# Patient Record
Sex: FEMALE | Race: BLACK OR AFRICAN AMERICAN | NOT HISPANIC OR LATINO | Employment: FULL TIME | URBAN - METROPOLITAN AREA
[De-identification: names, ages, dates, MRNs, and addresses within clinical notes are randomized per-mention and may not be internally consistent; named-entity substitution may affect disease eponyms.]

---

## 2021-12-26 ENCOUNTER — HOSPITAL ENCOUNTER (EMERGENCY)
Facility: HOSPITAL | Age: 26
Discharge: HOME | End: 2021-12-26
Attending: EMERGENCY MEDICINE
Payer: COMMERCIAL

## 2021-12-26 VITALS
DIASTOLIC BLOOD PRESSURE: 88 MMHG | OXYGEN SATURATION: 100 % | TEMPERATURE: 98 F | RESPIRATION RATE: 17 BRPM | SYSTOLIC BLOOD PRESSURE: 145 MMHG | HEART RATE: 74 BPM

## 2021-12-26 DIAGNOSIS — R07.89 CHEST WALL PAIN: Primary | ICD-10-CM

## 2021-12-26 LAB
B-HCG UR QL: NEGATIVE
CTP QC/QA: YES

## 2021-12-26 PROCEDURE — 81025 URINE PREGNANCY TEST: CPT | Mod: ER | Performed by: EMERGENCY MEDICINE

## 2021-12-26 PROCEDURE — 96372 THER/PROPH/DIAG INJ SC/IM: CPT | Mod: ER

## 2021-12-26 PROCEDURE — 99284 EMERGENCY DEPT VISIT MOD MDM: CPT | Mod: 25,ER

## 2021-12-26 PROCEDURE — 63600175 PHARM REV CODE 636 W HCPCS: Mod: ER | Performed by: EMERGENCY MEDICINE

## 2021-12-26 RX ORDER — CYCLOBENZAPRINE HCL 10 MG
10 TABLET ORAL 3 TIMES DAILY PRN
Qty: 20 TABLET | Refills: 0 | Status: SHIPPED | OUTPATIENT
Start: 2021-12-26 | End: 2022-01-05

## 2021-12-26 RX ORDER — NAPROXEN 500 MG/1
500 TABLET ORAL 2 TIMES DAILY WITH MEALS
Qty: 20 TABLET | Refills: 0 | Status: SHIPPED | OUTPATIENT
Start: 2021-12-26 | End: 2021-12-31

## 2021-12-26 RX ORDER — KETOROLAC TROMETHAMINE 30 MG/ML
30 INJECTION, SOLUTION INTRAMUSCULAR; INTRAVENOUS
Status: COMPLETED | OUTPATIENT
Start: 2021-12-26 | End: 2021-12-26

## 2021-12-26 RX ADMIN — KETOROLAC TROMETHAMINE 30 MG: 30 INJECTION, SOLUTION INTRAMUSCULAR at 08:12

## 2021-12-27 NOTE — ED PROVIDER NOTES
"Encounter Date: 12/26/2021    SCRIBE #1 NOTE: I, Tonny Jacobsen, am scribing for, and in the presence of,  Freddy Powers MD. I have scribed the following portions of the note - Other sections scribed: HPI, ROS, PE.       History     Chief Complaint   Patient presents with    Chest Pain     Pt reports mid sternal CP onset this AM, describes as "pulling" pain      Tracy Wilks is a 26 y.o. female who presents to the ED for evaluation of constant upper right sided chest pain, onset 2 AM this morning.  Patient describes her pain as a "Charley Horse" or muscle spasm that is a 7.5/10 on the pain scale.  Patient states that the pain does not radiate to her neck or any other region.  Patient notes that her pain is worse with deep breath and movement such as walking, bending over, or lifting her arm.  Patient notes that the pain is not worse with exertion.  Patient states that the pain woke her up.  Patient notes use of cough and chest congestion medicine for her symptoms.  Patient also notes shortness of breath and a cough.  Patient denies any allergies.  Patient denies smoking.  Patient denies using birth control.  Patient denies any recent long car trips, long plane trips, surgeries, or anything else that would increase her risk for pulmonary embolisms.  Patient denies any history of COVID-19 infection.  Patient denies any recent sick exposure.  Patient denies any possibility of pregnancy.  Patient denies fever, rhinorrhea, sore throat, head ache, swelling or pain to her calves, swelling or bruising to her chest, or any other associated symptoms.    The history is provided by the patient. No  was used.     Review of patient's allergies indicates:  No Known Allergies  History reviewed. No pertinent past medical history.  History reviewed. No pertinent surgical history.  No family history on file.  Social History     Tobacco Use    Smoking status: Never Smoker    Smokeless tobacco: Never Used "   Substance Use Topics    Alcohol use: Yes     Review of Systems   Constitutional: Negative for fever.   HENT: Negative for rhinorrhea and sore throat.    Eyes: Negative for visual disturbance.   Respiratory: Positive for cough and shortness of breath.    Cardiovascular: Positive for chest pain.   Gastrointestinal: Negative for abdominal pain.   Genitourinary: Negative for dysuria.   Musculoskeletal: Negative for back pain.   Skin: Negative for rash.   Neurological: Negative for headaches.       Physical Exam     Initial Vitals [12/26/21 1818]   BP Pulse Resp Temp SpO2   (!) 151/102 75 17 98.1 °F (36.7 °C) 100 %      MAP       --         Physical Exam    Nursing note and vitals reviewed.  Constitutional: She appears well-developed and well-nourished.   HENT:   Head: Normocephalic and atraumatic.   Eyes: EOM are normal. Pupils are equal, round, and reactive to light.   Neck: Neck supple. No thyromegaly present. No JVD present.   Normal range of motion.  Cardiovascular: Normal rate and regular rhythm. Exam reveals no gallop and no friction rub.    No murmur heard.  Pulmonary/Chest: Breath sounds normal. No respiratory distress.   Highly reproducible pain and tenderness to the right anterior chest wall.   Abdominal: Abdomen is soft. Bowel sounds are normal. There is no abdominal tenderness.   Musculoskeletal:         General: Normal range of motion.      Cervical back: Normal range of motion and neck supple.     Neurological: She is alert and oriented to person, place, and time. She has normal strength.   Skin: Skin is warm and dry. Capillary refill takes less than 2 seconds.         ED Course   Procedures  Labs Reviewed   POCT URINE PREGNANCY          Imaging Results    None          Medications   ketorolac injection 30 mg (30 mg Intramuscular Given 12/26/21 2010)     Medical Decision Making:   Clinical Tests:   Lab Tests: Ordered and Reviewed  The following lab test(s) were unremarkable: UPT          Scribe  Attestation:   Scribe #1: I performed the above scribed service and the documentation accurately describes the services I performed. I attest to the accuracy of the note.                 I, Freddy Walsh, personally performed the services described in this documentation. All medical record entries made by the scribe were at my direction and in my presence.  I have reviewed the chart and agree that the record reflects my personal performance and is accurate and complete      Clinical Impression:   Final diagnoses:  [R07.89] Chest wall pain (Primary)          ED Disposition Condition    Discharge Stable        ED Prescriptions     Medication Sig Dispense Start Date End Date Auth. Provider    naproxen (NAPROSYN) 500 MG tablet () Take 1 tablet (500 mg total) by mouth 2 (two) times daily with meals. for 5 days 20 tablet 2021 Freddy Walsh MD    cyclobenzaprine (FLEXERIL) 10 MG tablet () Take 1 tablet (10 mg total) by mouth 3 (three) times daily as needed for Muscle spasms. 20 tablet 2021 Freddy Walsh MD        Follow-up Information     Follow up With Specialties Details Why Contact [facility] ED Emergency Medicine  As needed, If symptoms worsen [address] [facility] [id number] [phone]    [facility]  Schedule an appointment as soon as possible for a visit  to establish primary care if you do not have one and to recheck your blood pressure that is elevated today. [address] [place] [address]  [phone]             Freddy Walsh MD  [phone]

## 2021-12-27 NOTE — ED NOTES
"Right side chest pain, states it feels like a "nate horse", tender to palpation and worse with right arm motions    "

## 2023-03-29 ENCOUNTER — PATIENT MESSAGE (OUTPATIENT)
Dept: OBSTETRICS AND GYNECOLOGY | Facility: CLINIC | Age: 28
End: 2023-03-29

## 2023-03-29 ENCOUNTER — OFFICE VISIT (OUTPATIENT)
Dept: OBSTETRICS AND GYNECOLOGY | Facility: CLINIC | Age: 28
End: 2023-03-29
Payer: COMMERCIAL

## 2023-03-29 VITALS — WEIGHT: 293 LBS | SYSTOLIC BLOOD PRESSURE: 140 MMHG | DIASTOLIC BLOOD PRESSURE: 80 MMHG

## 2023-03-29 DIAGNOSIS — Z11.3 SCREENING EXAMINATION FOR STD (SEXUALLY TRANSMITTED DISEASE): ICD-10-CM

## 2023-03-29 DIAGNOSIS — Z01.419 WELL WOMAN EXAM WITH ROUTINE GYNECOLOGICAL EXAM: Primary | ICD-10-CM

## 2023-03-29 DIAGNOSIS — N93.9 ABNORMAL UTERINE BLEEDING: ICD-10-CM

## 2023-03-29 PROCEDURE — 3077F PR MOST RECENT SYSTOLIC BLOOD PRESSURE >= 140 MM HG: ICD-10-PCS | Mod: CPTII,S$GLB,, | Performed by: OBSTETRICS & GYNECOLOGY

## 2023-03-29 PROCEDURE — 87591 N.GONORRHOEAE DNA AMP PROB: CPT | Performed by: OBSTETRICS & GYNECOLOGY

## 2023-03-29 PROCEDURE — 99385 PR PREVENTIVE VISIT,NEW,18-39: ICD-10-PCS | Mod: 1D,GY,S$GLB, | Performed by: OBSTETRICS & GYNECOLOGY

## 2023-03-29 PROCEDURE — 99999 PR PBB SHADOW E&M-EST. PATIENT-LVL III: CPT | Mod: PBBFAC,,, | Performed by: OBSTETRICS & GYNECOLOGY

## 2023-03-29 PROCEDURE — 3079F DIAST BP 80-89 MM HG: CPT | Mod: CPTII,S$GLB,, | Performed by: OBSTETRICS & GYNECOLOGY

## 2023-03-29 PROCEDURE — 1159F MED LIST DOCD IN RCRD: CPT | Mod: CPTII,S$GLB,, | Performed by: OBSTETRICS & GYNECOLOGY

## 2023-03-29 PROCEDURE — 99999 PR PBB SHADOW E&M-EST. PATIENT-LVL III: ICD-10-PCS | Mod: PBBFAC,,, | Performed by: OBSTETRICS & GYNECOLOGY

## 2023-03-29 PROCEDURE — 1160F RVW MEDS BY RX/DR IN RCRD: CPT | Mod: CPTII,S$GLB,, | Performed by: OBSTETRICS & GYNECOLOGY

## 2023-03-29 PROCEDURE — 1159F PR MEDICATION LIST DOCUMENTED IN MEDICAL RECORD: ICD-10-PCS | Mod: CPTII,S$GLB,, | Performed by: OBSTETRICS & GYNECOLOGY

## 2023-03-29 PROCEDURE — 3077F SYST BP >= 140 MM HG: CPT | Mod: CPTII,S$GLB,, | Performed by: OBSTETRICS & GYNECOLOGY

## 2023-03-29 PROCEDURE — 88175 CYTOPATH C/V AUTO FLUID REDO: CPT | Performed by: OBSTETRICS & GYNECOLOGY

## 2023-03-29 PROCEDURE — 1160F PR REVIEW ALL MEDS BY PRESCRIBER/CLIN PHARMACIST DOCUMENTED: ICD-10-PCS | Mod: CPTII,S$GLB,, | Performed by: OBSTETRICS & GYNECOLOGY

## 2023-03-29 PROCEDURE — 3079F PR MOST RECENT DIASTOLIC BLOOD PRESSURE 80-89 MM HG: ICD-10-PCS | Mod: CPTII,S$GLB,, | Performed by: OBSTETRICS & GYNECOLOGY

## 2023-03-29 PROCEDURE — 81514 NFCT DS BV&VAGINITIS DNA ALG: CPT | Performed by: OBSTETRICS & GYNECOLOGY

## 2023-03-29 PROCEDURE — 99385 PREV VISIT NEW AGE 18-39: CPT | Mod: 1D,GY,S$GLB, | Performed by: OBSTETRICS & GYNECOLOGY

## 2023-03-29 RX ORDER — TRANEXAMIC ACID 650 MG/1
1300 TABLET ORAL 3 TIMES DAILY
Qty: 30 TABLET | Refills: 0 | Status: SHIPPED | OUTPATIENT
Start: 2023-03-29 | End: 2023-04-03

## 2023-03-29 RX ORDER — TRANEXAMIC ACID 650 MG/1
1300 TABLET ORAL 3 TIMES DAILY
Qty: 30 TABLET | Refills: 0 | Status: SHIPPED | OUTPATIENT
Start: 2023-03-29 | End: 2023-03-29 | Stop reason: SDUPTHER

## 2023-03-29 NOTE — PROGRESS NOTES
History & Physical  Gynecology      SUBJECTIVE:     Chief Complaint: Annual Exam       History of Present Illness:  Annual Exam-Premenopausal  Ms. Wilks is a 26 y/o G0 who presents for annual exam. The patient reports that her periods are recently abnormal. She reports that her periods were normal and monthly until 08/2022. She had no period from August 2022 to December 2022. She reports that her periods were monthly Jan and Feb, but se started bleeding March 1 and has been bleeding ever since. She reports that she changes her pad 9x. The pads are not saturated but she does notice that the bleeding is heavier than her normal period. She denies new medications, weight changes, hair loss or facial hair growth.    The patient is sexually active. GYN screening history: last pap: was normal. The patient wears seatbelts: yes. The patient participates in regular exercise: no. Has the patient ever been transfused or tattooed?: not asked. The patient reports that there is not domestic violence in her life.      Review of patient's allergies indicates:  No Known Allergies    History reviewed. No pertinent past medical history.  History reviewed. No pertinent surgical history.  OB History    No obstetric history on file.       History reviewed. No pertinent family history.  Social History     Tobacco Use    Smoking status: Never    Smokeless tobacco: Never   Substance Use Topics    Alcohol use: Yes       Current Outpatient Medications   Medication Sig    tranexamic acid (LYSTEDA) 650 mg tablet Take 2 tablets (1,300 mg total) by mouth 3 (three) times daily. for 5 days     No current facility-administered medications for this visit.         Review of Systems:  Review of Systems   Constitutional:  Negative for chills and fever.   Eyes:  Negative for visual disturbance.   Respiratory:  Negative for cough and wheezing.    Cardiovascular:  Negative for chest pain and palpitations.   Gastrointestinal:  Negative for abdominal pain,  nausea and vomiting.   Genitourinary:  Positive for menorrhagia and menstrual problem. Negative for dysuria, frequency, hematuria, pelvic pain, vaginal bleeding, vaginal discharge and vaginal pain.   Neurological:  Negative for headaches.   Psychiatric/Behavioral:  Negative for depression.       OBJECTIVE:     Physical Exam:  Physical Exam  Vitals and nursing note reviewed. Exam conducted with a chaperone present.   Constitutional:       Appearance: She is well-developed.   Cardiovascular:      Rate and Rhythm: Normal rate.   Pulmonary:      Effort: Pulmonary effort is normal. No respiratory distress.   Chest:   Breasts:     Breasts are symmetrical.   Abdominal:      General: There is no distension.      Palpations: Abdomen is soft.      Tenderness: There is no abdominal tenderness.   Genitourinary:     Comments: Vaginal bleeding. Uterus not palpable  Skin:     General: Skin is warm and dry.   Neurological:      Mental Status: She is alert and oriented to person, place, and time.         ASSESSMENT:       ICD-10-CM ICD-9-CM    1. Well woman exam with routine gynecological exam  Z01.419 V72.31 Liquid-Based Pap Smear, Screening      2. Abnormal uterine bleeding  N93.9 626.9 TSH      Progesterone      LUTEINIZING HORMONE      Follicle Stimulating Hormone      17-HYDROXYPROGESTERONE      Testosterone, Free      US Pelvis Comp with Transvag NON-OB (xpd      tranexamic acid (LYSTEDA) 650 mg tablet      3. Screening examination for STD (sexually transmitted disease)  Z11.3 V74.5 RPR      HIV 1/2 Ag/Ab (4th Gen)      Hepatitis Panel, Acute      Vaginosis Screen by DNA Probe      C. trachomatis/N. gonorrhoeae by AMP DNA             Plan:      Tracy was seen today for annual exam.    Diagnoses and all orders for this visit:    Well woman exam with routine gynecological exam  -     Liquid-Based Pap Smear, Screening    Abnormal uterine bleeding  -     TSH; Future  -     Progesterone; Future  -     LUTEINIZING HORMONE; Future  -      Follicle Stimulating Hormone; Future  -     17-HYDROXYPROGESTERONE; Future  -     Testosterone, Free; Future  -     US Pelvis Comp with Transvag NON-OB (xpd; Future  -     tranexamic acid (LYSTEDA) 650 mg tablet; Take 2 tablets (1,300 mg total) by mouth 3 (three) times daily. for 5 days  - Discussed PCOS with patient using [place] Criteria. Patient with oligomenorrhea then heavy, extended period. Discussed lab tests ordered and TVUS to further explore the diagnosis.   -  Patient understands that OCPs would assist with hair growth and menstrual abnormalities. Discussed insulin resistance and cardiovascular disease that has been linked to PCOS. Discussed the most important step to take at this time is lifestyle modifications with exercise and diet modification.       Screening examination for STD (sexually transmitted disease)  -     RPR; Future  -     HIV 1/2 Ag/Ab (4th Gen); Future  -     Hepatitis Panel, Acute; Future  -     Vaginosis Screen by DNA Probe  -     C. trachomatis/N. gonorrhoeae by AMP DNA        Orders Placed This Encounter   Procedures    Vaginosis Screen by DNA Probe    C. trachomatis/N. gonorrhoeae by AMP DNA    US Pelvis Comp with Transvag NON-OB (xpd    TSH    RPR    HIV 1/2 Ag/Ab (4th Gen)    Hepatitis Panel, Acute    Progesterone    LUTEINIZING HORMONE    Follicle Stimulating Hormone    17-HYDROXYPROGESTERONE    Testosterone, Free       Follow up in about 1 year (around 3/29/2024) for Well Woman/Annual.    Counseling time: 45 minutes    Osman Gregory

## 2023-04-01 LAB
BACTERIAL VAGINOSIS DNA: POSITIVE
CANDIDA GLABRATA DNA: NEGATIVE
CANDIDA KRUSEI DNA: NEGATIVE
CANDIDA RRNA VAG QL PROBE: NEGATIVE
T VAGINALIS RRNA GENITAL QL PROBE: NEGATIVE

## 2023-04-02 DIAGNOSIS — B96.89 BV (BACTERIAL VAGINOSIS): Primary | ICD-10-CM

## 2023-04-02 DIAGNOSIS — N76.0 BV (BACTERIAL VAGINOSIS): Primary | ICD-10-CM

## 2023-04-02 LAB
C TRACH DNA SPEC QL NAA+PROBE: DETECTED
N GONORRHOEA DNA SPEC QL NAA+PROBE: NOT DETECTED

## 2023-04-02 RX ORDER — METRONIDAZOLE 500 MG/1
500 TABLET ORAL EVERY 12 HOURS
Qty: 14 TABLET | Refills: 0 | Status: SHIPPED | OUTPATIENT
Start: 2023-04-02 | End: 2023-04-03

## 2023-04-03 ENCOUNTER — PATIENT MESSAGE (OUTPATIENT)
Dept: OBSTETRICS AND GYNECOLOGY | Facility: CLINIC | Age: 28
End: 2023-04-03
Payer: COMMERCIAL

## 2023-04-03 DIAGNOSIS — A74.9 CHLAMYDIA INFECTION: ICD-10-CM

## 2023-04-03 DIAGNOSIS — N76.0 BV (BACTERIAL VAGINOSIS): ICD-10-CM

## 2023-04-03 DIAGNOSIS — N93.9 ABNORMAL UTERINE BLEEDING: ICD-10-CM

## 2023-04-03 DIAGNOSIS — B96.89 BV (BACTERIAL VAGINOSIS): ICD-10-CM

## 2023-04-03 DIAGNOSIS — A74.9 CHLAMYDIA INFECTION: Primary | ICD-10-CM

## 2023-04-03 RX ORDER — AZITHROMYCIN 500 MG/1
1000 TABLET, FILM COATED ORAL DAILY
Qty: 2 TABLET | Refills: 0 | Status: SHIPPED | OUTPATIENT
Start: 2023-04-03 | End: 2023-04-03

## 2023-04-03 RX ORDER — METRONIDAZOLE 500 MG/1
500 TABLET ORAL EVERY 12 HOURS
Qty: 14 TABLET | Refills: 0 | Status: SHIPPED | OUTPATIENT
Start: 2023-04-03 | End: 2023-04-10

## 2023-04-03 RX ORDER — TRANEXAMIC ACID 650 MG/1
1300 TABLET ORAL 3 TIMES DAILY
Qty: 30 TABLET | Refills: 0 | Status: SHIPPED | OUTPATIENT
Start: 2023-04-03 | End: 2023-04-08

## 2023-04-03 RX ORDER — AZITHROMYCIN 500 MG/1
1000 TABLET, FILM COATED ORAL DAILY
Qty: 2 TABLET | Refills: 0 | Status: SHIPPED | OUTPATIENT
Start: 2023-04-03 | End: 2023-04-04

## 2023-04-17 ENCOUNTER — HOSPITAL ENCOUNTER (OUTPATIENT)
Dept: RADIOLOGY | Facility: HOSPITAL | Age: 28
Discharge: HOME | End: 2023-04-17
Attending: OBSTETRICS & GYNECOLOGY
Payer: COMMERCIAL

## 2023-04-17 DIAGNOSIS — N93.9 ABNORMAL UTERINE BLEEDING: ICD-10-CM

## 2023-04-17 PROCEDURE — 76856 US PELVIS COMP WITH TRANSVAG NON-OB (XPD): ICD-10-PCS | Mod: 26,,, | Performed by: INTERNAL MEDICINE

## 2023-04-17 PROCEDURE — 76830 TRANSVAGINAL US NON-OB: CPT | Mod: 26,,, | Performed by: INTERNAL MEDICINE

## 2023-04-17 PROCEDURE — 76830 US PELVIS COMP WITH TRANSVAG NON-OB (XPD): ICD-10-PCS | Mod: 26,,, | Performed by: INTERNAL MEDICINE

## 2023-04-17 PROCEDURE — 76856 US EXAM PELVIC COMPLETE: CPT | Mod: TC

## 2023-04-17 PROCEDURE — 76856 US EXAM PELVIC COMPLETE: CPT | Mod: 26,,, | Performed by: INTERNAL MEDICINE

## 2023-08-17 ENCOUNTER — PATIENT MESSAGE (OUTPATIENT)
Dept: OBSTETRICS AND GYNECOLOGY | Facility: CLINIC | Age: 28
End: 2023-08-17
Payer: COMMERCIAL

## 2023-09-18 ENCOUNTER — PATIENT MESSAGE (OUTPATIENT)
Dept: OBSTETRICS AND GYNECOLOGY | Facility: CLINIC | Age: 28
End: 2023-09-18
Payer: COMMERCIAL

## 2023-09-20 ENCOUNTER — OFFICE VISIT (OUTPATIENT)
Dept: OBSTETRICS AND GYNECOLOGY | Facility: CLINIC | Age: 28
End: 2023-09-20
Payer: COMMERCIAL

## 2023-09-20 ENCOUNTER — LAB VISIT (OUTPATIENT)
Dept: LAB | Facility: HOSPITAL | Age: 28
End: 2023-09-20
Attending: PHYSICIAN ASSISTANT
Payer: COMMERCIAL

## 2023-09-20 VITALS — SYSTOLIC BLOOD PRESSURE: 150 MMHG | WEIGHT: 293 LBS | DIASTOLIC BLOOD PRESSURE: 84 MMHG

## 2023-09-20 DIAGNOSIS — N91.4 SECONDARY OLIGOMENORRHEA: Primary | ICD-10-CM

## 2023-09-20 DIAGNOSIS — N91.4 SECONDARY OLIGOMENORRHEA: ICD-10-CM

## 2023-09-20 LAB
B-HCG UR QL: NEGATIVE
CTP QC/QA: YES
HCG INTACT+B SERPL-ACNC: <1.2 MIU/ML

## 2023-09-20 PROCEDURE — 3077F SYST BP >= 140 MM HG: CPT | Mod: CPTII,S$GLB,, | Performed by: PHYSICIAN ASSISTANT

## 2023-09-20 PROCEDURE — 1159F MED LIST DOCD IN RCRD: CPT | Mod: CPTII,S$GLB,, | Performed by: PHYSICIAN ASSISTANT

## 2023-09-20 PROCEDURE — 83036 HEMOGLOBIN GLYCOSYLATED A1C: CPT | Performed by: PHYSICIAN ASSISTANT

## 2023-09-20 PROCEDURE — 99213 PR OFFICE/OUTPT VISIT, EST, LEVL III, 20-29 MIN: ICD-10-PCS | Mod: S$GLB,,, | Performed by: PHYSICIAN ASSISTANT

## 2023-09-20 PROCEDURE — 99213 OFFICE O/P EST LOW 20 MIN: CPT | Mod: S$GLB,,, | Performed by: PHYSICIAN ASSISTANT

## 2023-09-20 PROCEDURE — 87591 N.GONORRHOEAE DNA AMP PROB: CPT | Performed by: PHYSICIAN ASSISTANT

## 2023-09-20 PROCEDURE — 3079F PR MOST RECENT DIASTOLIC BLOOD PRESSURE 80-89 MM HG: ICD-10-PCS | Mod: CPTII,S$GLB,, | Performed by: PHYSICIAN ASSISTANT

## 2023-09-20 PROCEDURE — 81025 URINE PREGNANCY TEST: CPT | Mod: S$GLB,,, | Performed by: PHYSICIAN ASSISTANT

## 2023-09-20 PROCEDURE — 3079F DIAST BP 80-89 MM HG: CPT | Mod: CPTII,S$GLB,, | Performed by: PHYSICIAN ASSISTANT

## 2023-09-20 PROCEDURE — 1159F PR MEDICATION LIST DOCUMENTED IN MEDICAL RECORD: ICD-10-PCS | Mod: CPTII,S$GLB,, | Performed by: PHYSICIAN ASSISTANT

## 2023-09-20 PROCEDURE — 3077F PR MOST RECENT SYSTOLIC BLOOD PRESSURE >= 140 MM HG: ICD-10-PCS | Mod: CPTII,S$GLB,, | Performed by: PHYSICIAN ASSISTANT

## 2023-09-20 PROCEDURE — 99999 PR PBB SHADOW E&M-EST. PATIENT-LVL II: ICD-10-PCS | Mod: PBBFAC,,, | Performed by: PHYSICIAN ASSISTANT

## 2023-09-20 PROCEDURE — 84146 ASSAY OF PROLACTIN: CPT | Performed by: PHYSICIAN ASSISTANT

## 2023-09-20 PROCEDURE — 84702 CHORIONIC GONADOTROPIN TEST: CPT | Performed by: PHYSICIAN ASSISTANT

## 2023-09-20 PROCEDURE — 81025 POCT URINE PREGNANCY: ICD-10-PCS | Mod: S$GLB,,, | Performed by: PHYSICIAN ASSISTANT

## 2023-09-20 PROCEDURE — 36415 COLL VENOUS BLD VENIPUNCTURE: CPT | Performed by: PHYSICIAN ASSISTANT

## 2023-09-20 PROCEDURE — 99999 PR PBB SHADOW E&M-EST. PATIENT-LVL II: CPT | Mod: PBBFAC,,, | Performed by: PHYSICIAN ASSISTANT

## 2023-09-20 NOTE — PROGRESS NOTES
CC: oligomenorrhea    HISTORY OF PRESENT ILLNESS:    Tracy Wilks is a 28 y.o. female, ,  Presents today for a routine exam complaining of oligomenorrhea and positive home urine pregnancy test.  Patient's last menstrual period was 2023.   She is not currently on any contraception. UPT is NEGATIVE X 2.  Pregnancy is not desired. Sexual Activity:  ACTIVE, NO CONTRACEPTION . Last period was normal. RECENTLY CYCLES HAVE BEEN CHANGING, LONGER.   She is not taking a PNV.  She denies nausea/vomiting. Denies vaginal bleeding and pelvic pain.  FATHER AND MOTHER HAVE HTN, DM.   Denies PMH, abdominal surgeries, medications other than PNV, genetic family history (SC/CF). No personal history of DM. No personal or family history of thyroid disease. No h/o HSV. Does not use alcohol, tobacco, or illicit drugs. Feels safe at home.     OB history:     Prior pregnancies: 0      Last Pap:  result NILM     ROS:  GENERAL: No weight changes. No swelling. No fatigue. No fever.  CARDIOVASCULAR: No chest pain. No shortness of breath. No leg cramps.   NEUROLOGICAL: No headaches. No vision changes.  BREASTS: No pain. No lumps. No discharge.  ABDOMEN: No pain. No diarrhea. No constipation.  REPRODUCTIVE: No abnormal bleeding.   VULVA: No pain. No lesions. No itching.  VAGINA: No relaxation. No itching. No odor. No discharge. No lesions.  URINARY: No incontinence. No nocturia. No frequency. No dysuria.    MEDICATIONS AND ALLERGIES:  Reviewed    COMPREHENSIVE GYN HISTORY:  PAP History: Denies abnormal Paps.  Infection History: Denies STDs. Denies PID.  Benign History: Denies uterine fibroids. Denies ovarian cysts. Denies endometriosis. Denies other conditions.  Cancer History: Denies cervical cancer. Denies uterine cancer or hyperplasia. Denies ovarian cancer. Denies vulvar cancer or pre-cancer. Denies vaginal cancer or pre-cancer. Denies breast cancer. Denies colon cancer.  Sexual Activity History: Reports currently being  sexually active  Menstrual History: None.  Contraception: None    BP (!) 150/84   Wt (!) 148 kg (326 lb 2.7 oz)   LMP 07/05/2023     PE:  AFFECT: Calm, alert and oriented X 3. Interactive during exam  GENERAL: Appears well-nourished, well-developed, in no acute distress.  HEAD: Normocephalic, atruamatic  TEETH: Good dentition.  SKIN: Normal for race, warm, & dry. No lesions or rashes.  LYMPH NODES: No axillary or inguinal adenopathy.    PROCEDURES:  UPT NEGATIVE   GCCT URINE    ASSESSMENT/PLAN:    1. Secondary oligomenorrhea  - POCT Urine Pregnancy- NEGATIVE   - C. trachomatis/N. gonorrhoeae by AMP DNA [facility]; Urine  - HCG, Quantitative; Future  - PROLACTIN; Future  - WILL ORDER PROVERA IF RESULTS NORMAL TO INDUCE CYCLE. DISCUSSED importance to have a cycle at least q 3 months to reduce risk of endometrial hyperplasia.   Eva Lane PA-C

## 2023-09-21 ENCOUNTER — PATIENT MESSAGE (OUTPATIENT)
Dept: OBSTETRICS AND GYNECOLOGY | Facility: CLINIC | Age: 28
End: 2023-09-21
Payer: COMMERCIAL

## 2023-09-21 LAB
ESTIMATED AVG GLUCOSE: 114 MG/DL (ref 68–131)
HBA1C MFR BLD: 5.6 % (ref 4–5.6)
PROLACTIN SERPL IA-MCNC: 16.6 NG/ML (ref 5.2–26.5)

## 2023-09-21 RX ORDER — MEDROXYPROGESTERONE ACETATE 10 MG/1
10 TABLET ORAL DAILY
Qty: 10 TABLET | Refills: 0 | Status: SHIPPED | OUTPATIENT
Start: 2023-09-21 | End: 2023-11-08

## 2023-09-22 LAB
C TRACH DNA SPEC QL NAA+PROBE: NOT DETECTED
N GONORRHOEA DNA SPEC QL NAA+PROBE: NOT DETECTED

## 2023-11-08 ENCOUNTER — OFFICE VISIT (OUTPATIENT)
Dept: FAMILY MEDICINE | Facility: CLINIC | Age: 28
End: 2023-11-08
Payer: COMMERCIAL

## 2023-11-08 VITALS
WEIGHT: 293 LBS | HEART RATE: 98 BPM | RESPIRATION RATE: 16 BRPM | SYSTOLIC BLOOD PRESSURE: 134 MMHG | DIASTOLIC BLOOD PRESSURE: 82 MMHG | HEIGHT: 66 IN | TEMPERATURE: 99 F | OXYGEN SATURATION: 99 % | BODY MASS INDEX: 47.09 KG/M2

## 2023-11-08 DIAGNOSIS — Z76.89 ESTABLISHING CARE WITH NEW DOCTOR, ENCOUNTER FOR: ICD-10-CM

## 2023-11-08 DIAGNOSIS — Z00.00 ANNUAL PHYSICAL EXAM: Primary | ICD-10-CM

## 2023-11-08 PROCEDURE — 3079F DIAST BP 80-89 MM HG: CPT | Mod: CPTII,S$GLB,, | Performed by: INTERNAL MEDICINE

## 2023-11-08 PROCEDURE — 1159F PR MEDICATION LIST DOCUMENTED IN MEDICAL RECORD: ICD-10-PCS | Mod: CPTII,S$GLB,, | Performed by: INTERNAL MEDICINE

## 2023-11-08 PROCEDURE — 99395 PREV VISIT EST AGE 18-39: CPT | Mod: S$GLB,,, | Performed by: INTERNAL MEDICINE

## 2023-11-08 PROCEDURE — 1159F MED LIST DOCD IN RCRD: CPT | Mod: CPTII,S$GLB,, | Performed by: INTERNAL MEDICINE

## 2023-11-08 PROCEDURE — 3044F HG A1C LEVEL LT 7.0%: CPT | Mod: CPTII,S$GLB,, | Performed by: INTERNAL MEDICINE

## 2023-11-08 PROCEDURE — 99999 PR PBB SHADOW E&M-EST. PATIENT-LVL III: ICD-10-PCS | Mod: PBBFAC,,, | Performed by: INTERNAL MEDICINE

## 2023-11-08 PROCEDURE — 3008F BODY MASS INDEX DOCD: CPT | Mod: CPTII,S$GLB,, | Performed by: INTERNAL MEDICINE

## 2023-11-08 PROCEDURE — 99395 PR PREVENTIVE VISIT,EST,18-39: ICD-10-PCS | Mod: S$GLB,,, | Performed by: INTERNAL MEDICINE

## 2023-11-08 PROCEDURE — 1160F PR REVIEW ALL MEDS BY PRESCRIBER/CLIN PHARMACIST DOCUMENTED: ICD-10-PCS | Mod: CPTII,S$GLB,, | Performed by: INTERNAL MEDICINE

## 2023-11-08 PROCEDURE — 3079F PR MOST RECENT DIASTOLIC BLOOD PRESSURE 80-89 MM HG: ICD-10-PCS | Mod: CPTII,S$GLB,, | Performed by: INTERNAL MEDICINE

## 2023-11-08 PROCEDURE — 1160F RVW MEDS BY RX/DR IN RCRD: CPT | Mod: CPTII,S$GLB,, | Performed by: INTERNAL MEDICINE

## 2023-11-08 PROCEDURE — 3075F SYST BP GE 130 - 139MM HG: CPT | Mod: CPTII,S$GLB,, | Performed by: INTERNAL MEDICINE

## 2023-11-08 PROCEDURE — 3008F PR BODY MASS INDEX (BMI) DOCUMENTED: ICD-10-PCS | Mod: CPTII,S$GLB,, | Performed by: INTERNAL MEDICINE

## 2023-11-08 PROCEDURE — 99999 PR PBB SHADOW E&M-EST. PATIENT-LVL III: CPT | Mod: PBBFAC,,, | Performed by: INTERNAL MEDICINE

## 2023-11-08 PROCEDURE — 3075F PR MOST RECENT SYSTOLIC BLOOD PRESS GE 130-139MM HG: ICD-10-PCS | Mod: CPTII,S$GLB,, | Performed by: INTERNAL MEDICINE

## 2023-11-08 PROCEDURE — 3044F PR MOST RECENT HEMOGLOBIN A1C LEVEL <7.0%: ICD-10-PCS | Mod: CPTII,S$GLB,, | Performed by: INTERNAL MEDICINE

## 2023-11-08 NOTE — PROGRESS NOTES
Health Maintenance Due   Topic     Lipid Panel      Influenza Vaccine (1)     COVID-19 Vaccine (5 - 2023-24 season)

## 2023-11-08 NOTE — PROGRESS NOTES
Subjective:       Patient ID: Tracy Wilks is a pleasant 28 y.o. Black or  female patient    Chief Complaint: Establish Care and Annual Exam      Patient is a pt new to me and to my practice.    HPI     Pt with PMH as per list pf problems below coming today to establish care with a new provider and to do an annual physical.  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    She enjoys working there.  She has + family Hx of DM and HTN, as well as asthma (father).  She reports having issues to have a good lifestyle, she eats fast food and fried food rather often.    Patient Active Problem List   Diagnosis    BMI 50.0-59.9, adult          ACTIVE MEDICAL ISSUES:  Documented in Problem List     PAST MEDICAL HISTORY  Documented     PAST SURGICAL HISTORY:  Documented     SOCIAL HISTORY:  Documented     FAMILY HISTORY:  Documented     ALLERGIES AND MEDICATIONS: updated and reviewed.  Documented    Review of Systems   Constitutional:  Positive for activity change, appetite change and unexpected weight change. Negative for fatigue and fever.   HENT:  Negative for congestion, postnasal drip, rhinorrhea, sinus pressure and sore throat.    Eyes:  Negative for pain, discharge and redness.   Respiratory:  Negative for cough, chest tightness and shortness of breath.    Cardiovascular:  Negative for chest pain, palpitations and leg swelling.   Gastrointestinal:  Negative for abdominal pain, constipation and nausea.   Endocrine: Negative for cold intolerance and heat intolerance.   Genitourinary:  Negative for difficulty urinating, flank pain, frequency, menstrual problem, pelvic pain, urgency and vaginal discharge.   Musculoskeletal:  Negative for arthralgias, back pain, joint swelling and neck pain.   Skin:  Negative for color change and rash.   Allergic/Immunologic: Negative for environmental allergies and food allergies.   Neurological:  Negative for weakness, numbness and  "headaches.   Hematological:  Negative for adenopathy.   Psychiatric/Behavioral:  Negative for behavioral problems, hallucinations, sleep disturbance and suicidal ideas. The patient is not nervous/anxious.    All other systems reviewed and are negative.      Objective:      Physical Exam  Vitals and nursing note reviewed.   Constitutional:       Appearance: Normal appearance. She is well-developed. She is obese.   HENT:      Right Ear: Tympanic membrane and external ear normal.      Left Ear: Tympanic membrane and external ear normal.   Eyes:      Conjunctiva/sclera: Conjunctivae normal.   Neck:      Thyroid: No thyromegaly.   Cardiovascular:      Rate and Rhythm: Normal rate and regular rhythm.      Pulses: Normal pulses.      Heart sounds: Normal heart sounds.   Pulmonary:      Effort: Pulmonary effort is normal. No respiratory distress.      Breath sounds: Normal breath sounds. No wheezing.   Abdominal:      General: Bowel sounds are normal.      Palpations: Abdomen is soft. There is no mass.      Tenderness: There is no abdominal tenderness.   Musculoskeletal:         General: Normal range of motion.      Cervical back: Normal range of motion and neck supple.   Lymphadenopathy:      Cervical: No cervical adenopathy.   Skin:     General: Skin is warm and dry.   Neurological:      Mental Status: She is alert and oriented to person, place, and time. Mental status is at baseline.   Psychiatric:         Mood and Affect: Mood normal.         Behavior: Behavior normal.         Thought Content: Thought content normal.         Judgment: Judgment normal.         Vitals:    11/08/23 1350   BP: 134/82   BP Location: Right arm   Patient Position: Sitting   BP Method: Large (Manual)   Pulse: 98   Resp: 16   Temp: 98.8 °F (37.1 °C)   TempSrc: Oral   SpO2: 99%   Weight: (!) 148.2 kg (326 lb 11.6 oz)   Height: 5' 6" (1.676 m)     Body mass index is 52.73 kg/m².    RESULTS: Reviewed labs from last 12 months    Last Lab Results: "     Lab Results   Component Value Date    HGBA1C 5.6 09/20/2023    TSH 2.657 04/17/2023       Assessment:       1. Annual physical exam    2. Establishing care with new doctor, encounter for    3. BMI 50.0-59.9, adult        Plan:   Tracy was seen today for establish care and annual exam.    Diagnoses and all orders for this visit:    Annual physical exam    Pt had blood work done in 09/2023, no DM, declines blood work today. Discussed preventative measures and lifestyle.    Establishing care with new doctor, encounter for    Discussed the importance of good patient-doctor trust relationship, provided the patient with my contact.      BMI 50.0-59.9, adult    Patient does not have a very good lifestyle, she would to do better regarding fried food and fast food.  She also needs to be active.    No follow-ups on file.    This note was created by combination of typed  and M-Modal dictation.  Transcription errors may be present.  If there are any questions, please contact me.

## 2024-01-03 ENCOUNTER — TELEPHONE (OUTPATIENT)
Dept: FAMILY MEDICINE | Facility: CLINIC | Age: 29
End: 2024-01-03
Payer: COMMERCIAL

## 2024-01-03 ENCOUNTER — PATIENT MESSAGE (OUTPATIENT)
Dept: FAMILY MEDICINE | Facility: CLINIC | Age: 29
End: 2024-01-03
Payer: COMMERCIAL

## 2024-01-03 DIAGNOSIS — N30.00 ACUTE CYSTITIS WITHOUT HEMATURIA: Primary | ICD-10-CM

## 2024-01-03 RX ORDER — NITROFURANTOIN 25; 75 MG/1; MG/1
100 CAPSULE ORAL 2 TIMES DAILY
Qty: 10 CAPSULE | Refills: 0 | Status: SHIPPED | OUTPATIENT
Start: 2024-01-03 | End: 2024-01-14

## 2024-01-05 RX ORDER — PHENTERMINE HYDROCHLORIDE 37.5 MG/1
37.5 TABLET ORAL
Qty: 30 TABLET | Refills: 0 | Status: SHIPPED | OUTPATIENT
Start: 2024-01-05 | End: 2024-02-08

## 2024-07-24 ENCOUNTER — PATIENT OUTREACH (OUTPATIENT)
Dept: ADMINISTRATIVE | Facility: HOSPITAL | Age: 29
End: 2024-07-24
Payer: COMMERCIAL

## 2024-07-24 NOTE — PROGRESS NOTES
Population Health Chart Review & Patient Outreach Details      Additional Pop Health Notes:      DUE FOR PHYSICAL AFTER 11/8/2024.   PATIENT WILL SCHEDULE WHEN READY.         Updates Requested / Reviewed:      Updated Care Coordination Note, Care Everywhere, and Care Team Updated         Health Maintenance Topics Overdue:      VBHM Score: 0     Patient is not due for any topics at this time.                       Health Maintenance Topic(s) Outreach Outcomes & Actions Taken:    Provider Pt Reattribution - Outreach Outcomes & Actions Taken  : PATIENT WILL SCHEDULE WHEN READY.

## 2024-10-02 ENCOUNTER — PATIENT OUTREACH (OUTPATIENT)
Dept: ADMINISTRATIVE | Facility: HOSPITAL | Age: 29
End: 2024-10-02
Payer: COMMERCIAL

## 2024-10-02 NOTE — PROGRESS NOTES
Population Health Chart Review & Patient Outreach Details      Additional Pop Health Notes:      DUE FOR PHYSICAL AFTER 11/8/2024 EST.CARE. IF NO LONGER WITH [facility] NEED TO UPDATE TEAMS.   Left message for patient to return call.         Updates Requested / Reviewed:      Updated Care Coordination Note and Care Everywhere         Health Maintenance Topics Overdue:      VB Score: 0     Patient is not due for any topics at this time.                       Health Maintenance Topic(s) Outreach Outcomes & Actions Taken:    Provider Pt Reattribution - Outreach Outcomes & Actions Taken  : Left message for patient to return call.